# Patient Record
Sex: FEMALE | Race: ASIAN | URBAN - METROPOLITAN AREA
[De-identification: names, ages, dates, MRNs, and addresses within clinical notes are randomized per-mention and may not be internally consistent; named-entity substitution may affect disease eponyms.]

---

## 2021-04-24 ENCOUNTER — HOSPITAL ENCOUNTER (EMERGENCY)
Age: 46
Discharge: HOME | End: 2021-04-24
Payer: COMMERCIAL

## 2021-04-24 VITALS
WEIGHT: 194.45 LBS | HEIGHT: 59 IN | DIASTOLIC BLOOD PRESSURE: 92 MMHG | HEART RATE: 80 BPM | BODY MASS INDEX: 39.2 KG/M2 | TEMPERATURE: 97.7 F | RESPIRATION RATE: 14 BRPM | OXYGEN SATURATION: 99 % | SYSTOLIC BLOOD PRESSURE: 141 MMHG

## 2021-04-24 DIAGNOSIS — R04.0 EPISTAXIS: Primary | ICD-10-CM

## 2021-04-24 DIAGNOSIS — R03.0 ELEVATED BLOOD PRESSURE READING: ICD-10-CM

## 2021-04-24 PROCEDURE — 6370000000 HC RX 637 (ALT 250 FOR IP): Performed by: PHYSICIAN ASSISTANT

## 2021-04-24 PROCEDURE — 99284 EMERGENCY DEPT VISIT MOD MDM: CPT

## 2021-04-24 RX ORDER — OXYMETAZOLINE HYDROCHLORIDE 0.05 G/100ML
2 SPRAY NASAL ONCE
Status: COMPLETED | OUTPATIENT
Start: 2021-04-24 | End: 2021-04-24

## 2021-04-24 RX ADMIN — OXYMETAZOLINE HCL 2 SPRAY: 0.05 SPRAY NASAL at 11:53

## 2021-04-24 NOTE — ED PROVIDER NOTES
reports that she has never smoked. She has never used smokeless tobacco. She reports that she does not drink alcohol or use drugs. PHYSICAL EXAM    (up to 7 for level 4, 8 or more for level 5)     ED Triage Vitals [04/24/21 1043]   BP Temp Temp Source Pulse Resp SpO2 Height Weight   (!) 141/92 97.7 °F (36.5 °C) Temporal 80 14 99 % 4' 11\" (1.499 m) 194 lb 7.1 oz (88.2 kg)       Physical Exam  Constitutional:       General: She is not in acute distress. Appearance: Normal appearance. She is well-developed. She is not ill-appearing, toxic-appearing or diaphoretic. HENT:      Head: Normocephalic and atraumatic. Nose:      Comments: Nasal clamp on, no active bleeding     Mouth/Throat:      Comments: Scant blood in posterior oropharynx  Eyes:      Comments: No blood coming from inner tear ducts bilaterally   Neck:      Musculoskeletal: Normal range of motion and neck supple. Pulmonary:      Effort: Pulmonary effort is normal. No respiratory distress. Musculoskeletal: Normal range of motion. Neurological:      Mental Status: She is alert. Psychiatric:         Mood and Affect: Mood normal.         Behavior: Behavior normal.         Thought Content: Thought content normal.         Judgment: Judgment normal.         DIFFERENTIAL DIAGNOSIS   Epistaxis, coagulopathy, anemia, other    DIAGNOSTICRESULTS         RADIOLOGY:   Non-plain film images such as CT, Ultrasound and MRI are read by the radiologist. Plain radiographic images are visualized and preliminarily interpreted by TOM Villar with the below findings:      Interpretation per the Radiologist below, if available at the time of this note:    No orders to display         LABS:  No results found for this visit on 04/24/21. All other labs were within normal range or not returned as of this dictation.     EMERGENCY DEPARTMENT COURSE and DIFFERENTIALDIAGNOSIS/MDM:   Vitals:    Vitals:    04/24/21 1043 04/24/21 1313   BP: (!) 141/92 (!)

## 2021-04-25 ASSESSMENT — ENCOUNTER SYMPTOMS
VOMITING: 0
NAUSEA: 0

## 2021-05-05 ENCOUNTER — OFFICE VISIT (OUTPATIENT)
Dept: ENT CLINIC | Age: 46
End: 2021-05-05
Payer: COMMERCIAL

## 2021-05-05 VITALS
TEMPERATURE: 97.7 F | HEART RATE: 81 BPM | WEIGHT: 177 LBS | BODY MASS INDEX: 35.68 KG/M2 | DIASTOLIC BLOOD PRESSURE: 88 MMHG | SYSTOLIC BLOOD PRESSURE: 126 MMHG | HEIGHT: 59 IN

## 2021-05-05 DIAGNOSIS — R04.0 EPISTAXIS: Primary | ICD-10-CM

## 2021-05-05 PROCEDURE — 31238 NSL/SINS NDSC SRG NSL HEMRRG: CPT | Performed by: OTOLARYNGOLOGY

## 2021-05-05 NOTE — PROGRESS NOTES
Patient was to be evaluated for epistaxis. she has been having intermittent right-sided nosebleeds for the past couple of weeks. It always starts in the right. Sometimes when she pinches her nose it will go to the left. She said it came out of her tear duct once. She has no history of bleeding issues. No history of nasal surgery. No subjective nasal congestion. Procedure  Nasal endoscopy with control of epistaxis  Afrin and lidocaine were applied the bilateral nasal cavity  Rigid scope was used to visualize the bilateral nasal cavity. Patient has very narrow nasal apertures. She has a relatively prominent maxillary crest with a very minor rightward septal deviation. There is an area on the maxillary crest on the right side that is excoriated with evidence of recent bleeding. More posteriorly the nasal cavity I do not appreciate any masses or lesions. Using the endoscope I was able to cauterize the area on the septum on the right side with silver nitrate and then cover it with Surgicel. Patient tolerated well. Plan  Patient had an area on the septum was bleeding. She is somewhat difficult to examine because she has very narrow nasal apertures. I was able to see this well using the endoscope. I would like for her to use nasal saline several times a day. If she has an active nosebleed I would like for her to follow-up in my clinic during that time. She should call the office. Otherwise I will see her in 2 weeks to make sure she is doing okay.

## 2021-05-20 ENCOUNTER — OFFICE VISIT (OUTPATIENT)
Dept: ENT CLINIC | Age: 46
End: 2021-05-20
Payer: COMMERCIAL

## 2021-05-20 VITALS
WEIGHT: 177 LBS | TEMPERATURE: 97.8 F | HEIGHT: 59 IN | BODY MASS INDEX: 35.68 KG/M2 | DIASTOLIC BLOOD PRESSURE: 81 MMHG | SYSTOLIC BLOOD PRESSURE: 118 MMHG

## 2021-05-20 DIAGNOSIS — R04.0 EPISTAXIS: Primary | ICD-10-CM

## 2021-05-20 PROCEDURE — 99212 OFFICE O/P EST SF 10 MIN: CPT | Performed by: OTOLARYNGOLOGY

## 2021-05-20 NOTE — PROGRESS NOTES
[facility]- HEAD & NECK SURGERY  Follow up      Patient Name: Tory Correia  Medical Record Number:  [id number]  Primary Care Physician:  No primary care provider on file. Date of Consultation: 5/20/2021    Chief Complaint: Epistaxis        Interval History  Patient is following up for epistaxis. I saw her on May 5 and cauterize an area on the right septum that was bleeding. She is not had any bleeding since that time. She has been using the nasal saline. RE as above VIEW OF SYSTEMS      PHYSICAL EXAM  GENERAL: No Acute Distress, Alert and Oriented, no Hoarseness, strong voice  EYES: EOMI, Anti-icteric  HENT:   Head: Normocephalic and atraumatic. Nose: Anterior anoscopy shows a little bit of crusting on the right anterior septum that appears to be healing well. No evidence of recent bleeding              ASSESSMENT/PLAN  1. Epistaxis  I feel as though the silver nitrate was able to control this. If she has a repeat bleed I have asked her to call and I am happy to see her. I would use the nasal saline a few times a day for the next 2 to 3 weeks while the septum is healing. I have performed a head and neck physical exam personally or was physically present during the key or critical portions of the service. This note was generated completely or in part utilizing Dragon dictation speech recognition software. Occasionally, words are mistranscribed and despite editing, the text may contain inaccuracies due to incorrect word recognition. If further clarification is needed please contact the office at [phone].

## 2022-08-03 ENCOUNTER — HOSPITAL ENCOUNTER (OUTPATIENT)
Dept: MAMMOGRAPHY | Age: 47
Discharge: HOME | End: 2022-08-03
Payer: COMMERCIAL

## 2022-08-03 VITALS — BODY MASS INDEX: 36.29 KG/M2 | WEIGHT: 180 LBS | HEIGHT: 59 IN

## 2022-08-03 DIAGNOSIS — Z12.31 VISIT FOR SCREENING MAMMOGRAM: ICD-10-CM

## 2022-08-03 PROCEDURE — 77063 BREAST TOMOSYNTHESIS BI: CPT
